# Patient Record
Sex: FEMALE | Race: BLACK OR AFRICAN AMERICAN | NOT HISPANIC OR LATINO | Employment: UNEMPLOYED | URBAN - METROPOLITAN AREA
[De-identification: names, ages, dates, MRNs, and addresses within clinical notes are randomized per-mention and may not be internally consistent; named-entity substitution may affect disease eponyms.]

---

## 2019-05-22 PROBLEM — R76.8 SEROPOSITIVE FOR HERPES SIMPLEX 2 INFECTION: Status: ACTIVE | Noted: 2019-04-24

## 2019-05-22 PROBLEM — Z87.51 HISTORY OF PRETERM DELIVERY: Status: ACTIVE | Noted: 2019-04-24

## 2019-05-22 PROBLEM — N93.9 VAGINAL SPOTTING: Status: ACTIVE | Noted: 2019-05-22

## 2019-05-22 PROBLEM — J45.20 MILD INTERMITTENT ASTHMA WITHOUT COMPLICATION: Status: ACTIVE | Noted: 2019-05-22

## 2019-05-22 PROBLEM — N89.8 VAGINAL DISCHARGE: Status: ACTIVE | Noted: 2019-05-22

## 2019-05-22 PROBLEM — O23.41 UTI IN PREGNANCY, ANTEPARTUM, FIRST TRIMESTER: Status: ACTIVE | Noted: 2019-05-22

## 2019-05-22 PROBLEM — O99.331 TOBACCO USE AFFECTING PREGNANCY IN FIRST TRIMESTER, ANTEPARTUM: Status: ACTIVE | Noted: 2019-04-24

## 2019-05-22 PROBLEM — O09.91 SUPERVISION OF HIGH RISK PREGNANCY IN FIRST TRIMESTER: Status: ACTIVE | Noted: 2019-04-24

## 2019-05-22 PROBLEM — Z3A.12 12 WEEKS GESTATION OF PREGNANCY: Status: ACTIVE | Noted: 2019-05-22

## 2019-05-22 PROBLEM — N89.8 VAGINAL DISCHARGE: Status: RESOLVED | Noted: 2019-05-22 | Resolved: 2019-05-22

## 2019-06-19 ENCOUNTER — SOCIAL WORK (OUTPATIENT)
Dept: ADMINISTRATIVE | Facility: OTHER | Age: 24
End: 2019-06-19

## 2019-06-19 PROBLEM — O09.92 SUPERVISION OF HIGH RISK PREGNANCY IN SECOND TRIMESTER: Status: ACTIVE | Noted: 2019-04-24

## 2019-06-19 PROBLEM — Z3A.16 16 WEEKS GESTATION OF PREGNANCY: Status: ACTIVE | Noted: 2019-05-22

## 2019-06-19 NOTE — PROGRESS NOTES
PERCY met with pt regarding medication([place]);per pt need her medication([place]) set up to receive at home. PERCY informed pt need to make a copy of her medicaid card to send with the [facility] Prescription Form for the medication([place]). PERCY completed form and fax to([phone]) [facility]. No other needs identified at this time.    Radha Farooq,MSW  Pager#[phone]

## 2019-07-09 ENCOUNTER — SOCIAL WORK (OUTPATIENT)
Dept: ADMINISTRATIVE | Facility: OTHER | Age: 24
End: 2019-07-09

## 2019-07-09 NOTE — PROGRESS NOTES
PERCY received paperwork from [facility] at [facility] requesting additional information on pt( Delivery History & Ultra Sound Report). PERCY fax the requested information to([phone]), No other needs identified at this time.     TRAVIS Boucher   Pager#[phone]

## 2019-07-17 PROBLEM — Z3A.20 20 WEEKS GESTATION OF PREGNANCY: Status: ACTIVE | Noted: 2019-05-22

## 2019-07-23 ENCOUNTER — SOCIAL WORK (OUTPATIENT)
Dept: ADMINISTRATIVE | Facility: OTHER | Age: 24
End: 2019-07-23

## 2019-07-23 NOTE — PROGRESS NOTES
PERCY received physician plan of treatment for 17P therapy from [facility] regarding md signature for pt homecare services. [facility] will administer pharmacy-compounded 17 alpha-hydroxprogesterone caproate(preservative-free) 250 mg(1ml)IM weekly beginning 07-17-19 until 36+6. PERCY later received signed paperwork from md. PERCY faxed signed paperwork to([phone]). PERCY scanned approval form into epic. No other needs identified at this time.    Radha Farooq,MSW  Pager#[phone]

## 2019-08-08 ENCOUNTER — SOCIAL WORK (OUTPATIENT)
Dept: ADMINISTRATIVE | Facility: OTHER | Age: 24
End: 2019-08-08

## 2019-08-08 NOTE — PROGRESS NOTES
SW spoke with MD regarding pt being incarcerated at this time and will need her alida injection;per MD need to find out what facility pt is currently in and once this information is gather contact the facility with an appointment for pt to be seen to get her alida injection here in the clinic. PERCY made phone call to Anne Marie([phone]) to find out the name of facility pt is in SW was given the facility name by Anne Marie. The facility was contact and given pt appointment date/time. No other needs identified at this time.    TRAVIS Boucher  Pager#[phone].

## 2019-08-08 NOTE — PROGRESS NOTES
PERCY received phone call from Anne Marie at [facility]([phone]);per [facility] has been giving pt weekly alida injection and just learn from pt's mother that pt is currently incarcerated until(8/29/19).Anne Marie stated just wanted the clinic to be aware of the above.PERCY will notify MD regarding the above. No other needs identified at this time.    Radha Farooq,MSW  Pager#[phone]

## 2019-08-14 PROBLEM — N93.9 VAGINAL SPOTTING: Status: RESOLVED | Noted: 2019-05-22 | Resolved: 2019-08-14

## 2019-08-23 ENCOUNTER — SOCIAL WORK (OUTPATIENT)
Dept: ADMINISTRATIVE | Facility: OTHER | Age: 24
End: 2019-08-23

## 2019-08-23 NOTE — PROGRESS NOTES
SW met with MD regarding pt's alida injection been restarted;per MD will not restart pt's alida injection and this has been discuss with pt at last office visit(8/14/19). SW also provide MD with discharge notification from [facility]. PERCY scanned paperwork into epic. No other needs identified at this time.    Radha Farooq,MSW  Pager#[phone]

## 2019-09-05 ENCOUNTER — SOCIAL WORK (OUTPATIENT)
Dept: ADMINISTRATIVE | Facility: OTHER | Age: 24
End: 2019-09-05

## 2019-09-05 NOTE — PROGRESS NOTES
SW received pt's medication report from [facility] for the 17P therapy. SW scanned the medication report into epic.on 09-05-19. No other needs identified at this time.    Radha Farooq,MSW  Pager#[phone]

## 2019-09-16 PROBLEM — Z3A.20 20 WEEKS GESTATION OF PREGNANCY: Status: RESOLVED | Noted: 2019-05-22 | Resolved: 2019-09-16

## 2019-11-25 PROBLEM — Z87.51 HISTORY OF PRETERM DELIVERY: Status: RESOLVED | Noted: 2019-04-24 | Resolved: 2019-11-25

## 2019-11-25 PROBLEM — O23.41 UTI IN PREGNANCY, ANTEPARTUM, FIRST TRIMESTER: Status: RESOLVED | Noted: 2019-05-22 | Resolved: 2019-11-25

## 2019-11-25 PROBLEM — O09.92 SUPERVISION OF HIGH RISK PREGNANCY IN SECOND TRIMESTER: Status: RESOLVED | Noted: 2019-04-24 | Resolved: 2019-11-25

## 2019-11-25 PROBLEM — Z23 INFLUENZA VACCINE ADMINISTERED: Status: ACTIVE | Noted: 2019-11-25

## 2023-06-20 ENCOUNTER — PATIENT MESSAGE (OUTPATIENT)
Dept: RESEARCH | Facility: HOSPITAL | Age: 28
End: 2023-06-20